# Patient Record
Sex: MALE | Race: WHITE | NOT HISPANIC OR LATINO
[De-identification: names, ages, dates, MRNs, and addresses within clinical notes are randomized per-mention and may not be internally consistent; named-entity substitution may affect disease eponyms.]

---

## 2021-08-10 PROBLEM — Z00.00 ENCOUNTER FOR PREVENTIVE HEALTH EXAMINATION: Status: ACTIVE | Noted: 2021-08-10

## 2021-08-26 ENCOUNTER — APPOINTMENT (OUTPATIENT)
Dept: PULMONOLOGY | Facility: CLINIC | Age: 76
End: 2021-08-26
Payer: MEDICARE

## 2021-08-26 VITALS
HEIGHT: 65 IN | SYSTOLIC BLOOD PRESSURE: 114 MMHG | BODY MASS INDEX: 26.33 KG/M2 | HEART RATE: 74 BPM | DIASTOLIC BLOOD PRESSURE: 80 MMHG | OXYGEN SATURATION: 95 % | TEMPERATURE: 97.5 F | WEIGHT: 158 LBS

## 2021-08-26 DIAGNOSIS — Z86.39 PERSONAL HISTORY OF OTHER ENDOCRINE, NUTRITIONAL AND METABOLIC DISEASE: ICD-10-CM

## 2021-08-26 DIAGNOSIS — Z86.79 PERSONAL HISTORY OF OTHER DISEASES OF THE CIRCULATORY SYSTEM: ICD-10-CM

## 2021-08-26 DIAGNOSIS — Z85.828 PERSONAL HISTORY OF OTHER MALIGNANT NEOPLASM OF SKIN: ICD-10-CM

## 2021-08-26 DIAGNOSIS — Q79.1 OTHER CONGENITAL MALFORMATIONS OF DIAPHRAGM: ICD-10-CM

## 2021-08-26 DIAGNOSIS — Z87.891 PERSONAL HISTORY OF NICOTINE DEPENDENCE: ICD-10-CM

## 2021-08-26 PROCEDURE — 99204 OFFICE O/P NEW MOD 45 MIN: CPT | Mod: 25

## 2021-08-26 PROCEDURE — 71046 X-RAY EXAM CHEST 2 VIEWS: CPT

## 2021-08-26 RX ORDER — ESZOPICLONE 3 MG/1
TABLET, COATED ORAL
Refills: 0 | Status: ACTIVE | COMMUNITY

## 2021-08-26 RX ORDER — ATORVASTATIN CALCIUM 80 MG/1
TABLET, FILM COATED ORAL
Refills: 0 | Status: ACTIVE | COMMUNITY

## 2021-08-26 RX ORDER — CLONAZEPAM 2 MG/1
TABLET ORAL
Refills: 0 | Status: ACTIVE | COMMUNITY

## 2021-08-26 RX ORDER — WARFARIN 4 MG/1
TABLET ORAL
Refills: 0 | Status: ACTIVE | COMMUNITY

## 2021-08-26 RX ORDER — SERTRALINE 25 MG/1
TABLET, FILM COATED ORAL
Refills: 0 | Status: ACTIVE | COMMUNITY

## 2021-08-26 RX ORDER — HYDROCHLOROTHIAZIDE 12.5 MG/1
TABLET ORAL
Refills: 0 | Status: ACTIVE | COMMUNITY

## 2021-08-26 RX ORDER — EPLERENONE 25 MG/1
25 TABLET, COATED ORAL
Refills: 0 | Status: ACTIVE | COMMUNITY

## 2021-08-26 RX ORDER — HYDROCODONE BITARTRATE AND ACETAMINOPHEN 7.5; 325 MG/1; MG/1
TABLET ORAL
Refills: 0 | Status: ACTIVE | COMMUNITY

## 2021-08-26 RX ORDER — FENTANYL 100 UG/H
100 PATCH, EXTENDED RELEASE TRANSDERMAL
Refills: 0 | Status: ACTIVE | COMMUNITY

## 2021-08-26 RX ORDER — ESZOPICLONE 3 MG/1
TABLET, FILM COATED ORAL
Refills: 0 | Status: ACTIVE | COMMUNITY

## 2021-08-26 NOTE — HISTORY OF PRESENT ILLNESS
[TextBox_4] : 08/26/2021: Asked to evaluate patient by Dr Bai for dyspnea; PMD Dr Couch. The patient had AV repair and MV repair (1999) and re-op L sternotomy 2002 for AV replacement by Dr Koo. 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 He was evaluated by thoracic surgery at [facility] in 2019 and had CPET showing low normal breathing reserve without cardiac limitation and he underwent pulmonary rehab, the duration of which he can't recall but he did not find it helpful. They mutually decided not to do surgery. He comes in today in the setting of this hemidiaphragm elevation with worsening dyspnea since 2019 and dizziness on walking. Former smoker, quit 40 years ago when daughter born. 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 Lives [facility], no mold or water in home, no pets.\par

## 2021-08-26 NOTE — CONSULT LETTER
[Dear  ___] : Dear  [unfilled], [FreeTextEntry2] : Baldev Bai MD\par [address]\[place] [address]\par  [DrJohn  ___] : Dr. CHRISTOPHER

## 2021-08-26 NOTE — ASSESSMENT
[FreeTextEntry1] : Data reviewed:\par \par PA/lat CXR in office 08/26/2021 : markedly elevated L hemidiaphragm about 1/2 way up chest, sternotomy, valve, no infiltrate, effusion or mass\par \par PFT [facility] 5/2017: FVC 2.69L (78%), FEV1 2.04L (82%), TLC 4.69L (93%), DLCO 16.3 (70%) / 6MWD 940 ft and no desaturation\par \par Impression:\par Dyspnea due to elevated L hemidiaphragm\par \par Plan:\par He seems pretty clear that he does not want to entertain surgery, so I fear what I can do to help may be limited. I will start by repeating his lung function test for comparison for 2017. We could also repeat a CPET to further objectively assess functional decline since 2017. There is no evidence that he has any other lung disease but repeating the PFT will also help to evaluate for any other process such as COPD - there was no COPD in 2017, though, and he quit smoking decades ago.\par Most people will benefit from rehab. Unclear if he completed a long enough course of rehab to benefit, but denies any improvement in 2019.\par Discussed that he is not part of the group eligible for Covid booster at this time.

## 2021-08-26 NOTE — PHYSICAL EXAM
[No Acute Distress] : no acute distress [Normal Rate/Rhythm] : normal rate/rhythm [TextBox_54] : mechanical click [TextBox_68] : diminished at L base [TextBox_80] : sternotomy w nodular protuberance along R sternal border [TextBox_105] : ankle edema

## 2021-09-22 ENCOUNTER — APPOINTMENT (OUTPATIENT)
Dept: PULMONOLOGY | Facility: CLINIC | Age: 76
End: 2021-09-22
Payer: MEDICARE

## 2021-09-22 VITALS
HEART RATE: 63 BPM | HEIGHT: 65 IN | OXYGEN SATURATION: 92 % | WEIGHT: 158 LBS | BODY MASS INDEX: 26.33 KG/M2 | SYSTOLIC BLOOD PRESSURE: 112 MMHG | TEMPERATURE: 98 F | DIASTOLIC BLOOD PRESSURE: 70 MMHG

## 2021-09-22 DIAGNOSIS — J98.6 DISORDERS OF DIAPHRAGM: ICD-10-CM

## 2021-09-22 PROCEDURE — 94060 EVALUATION OF WHEEZING: CPT

## 2021-09-22 PROCEDURE — 94729 DIFFUSING CAPACITY: CPT

## 2021-09-22 PROCEDURE — 99213 OFFICE O/P EST LOW 20 MIN: CPT | Mod: 25

## 2021-09-22 PROCEDURE — 94727 GAS DIL/WSHOT DETER LNG VOL: CPT

## 2021-09-22 NOTE — ASSESSMENT
[FreeTextEntry1] : Data reviewed:\par \par Echo 8/2021: mild LV dilatation w normal function, normal RV\par \par PA/lat CXR in office 08/26/2021 : markedly elevated L hemidiaphragm about 1/2 way up chest, sternotomy, valve, no infiltrate, effusion or mass\par \par PFT [facility] 5/2017: FVC 2.69L (78%), FEV1 2.04L (82%), TLC 4.69L (93%), DLCO 16.3 (70%) / 6MWD 940 ft and no desaturation\par PFT 9/22/21: FVC 34L (68%), FEV1 1.74L (71%), TLC 3.61L (59%), DLCO 14.7 (67%) \par \par Impression:\par Dyspnea due to elevated L hemidiaphragm\par \par Plan:\par No evidence for an airways process on the PFT, or new parenchymal disease with a stable DLCO. I think the choices are consideration of surgery, rehab, or leave it alone. He doesn't like any of these choices. He will consider meeting with surgery for a second opinion (previously consulted w Dr Sunil Brennan).

## 2021-09-22 NOTE — HISTORY OF PRESENT ILLNESS
[TextBox_4] : 08/26/2021: Asked to evaluate patient by Dr Bai for dyspnea; PMD Dr Couch. The patient had AV repair and MV repair (1999) and re-op L sternotomy 2002 for AV replacement by Dr Koo. 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 He was evaluated by thoracic surgery at [facility] in 2019 and had CPET showing low normal breathing reserve without cardiac limitation and he underwent pulmonary rehab, the duration of which he can't recall but he did not find it helpful. They mutually decided not to do surgery. He comes in today in the setting of this hemidiaphragm elevation with worsening dyspnea since 2019 and dizziness on walking. Former smoker, quit 40 years ago when daughter born. 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 Lives [facility], no mold or water in home, no pets.\par \par 9/22/21: Returns to review PFT and options. No new problems, no change in symptoms.\par

## 2021-09-22 NOTE — PHYSICAL EXAM
[No Acute Distress] : no acute distress [Normal Rate/Rhythm] : normal rate/rhythm [TextBox_54] : mechanical click [TextBox_68] : diminished at L base